# Patient Record
Sex: FEMALE | Race: WHITE | NOT HISPANIC OR LATINO | Employment: FULL TIME | URBAN - METROPOLITAN AREA
[De-identification: names, ages, dates, MRNs, and addresses within clinical notes are randomized per-mention and may not be internally consistent; named-entity substitution may affect disease eponyms.]

---

## 2018-12-17 LAB
HUMAN PAPILLOMAVIRUS (HPV): POSITIVE
PAP RECOMMENDATION EXT: NORMAL
PAP SMEAR: NORMAL

## 2019-09-09 ENCOUNTER — HISTORICAL (OUTPATIENT)
Dept: PREADMISSION TESTING | Facility: HOSPITAL | Age: 54
End: 2019-09-09

## 2019-09-09 LAB
ABS NEUT (OLG): 3.89 X10(3)/MCL (ref 2.1–9.2)
ALBUMIN SERPL-MCNC: 4.2 GM/DL (ref 3.4–5)
ALBUMIN/GLOB SERPL: 1.2 RATIO (ref 1.1–2)
ALP SERPL-CCNC: 122 UNIT/L (ref 38–126)
ALT SERPL-CCNC: 24 UNIT/L (ref 12–78)
AST SERPL-CCNC: 20 UNIT/L (ref 15–37)
BASOPHILS # BLD AUTO: 0.1 X10(3)/MCL (ref 0–0.2)
BASOPHILS NFR BLD AUTO: 1 %
BILIRUB SERPL-MCNC: 0.4 MG/DL (ref 0.2–1)
BILIRUBIN DIRECT+TOT PNL SERPL-MCNC: 0.1 MG/DL (ref 0–0.5)
BILIRUBIN DIRECT+TOT PNL SERPL-MCNC: 0.3 MG/DL (ref 0–0.8)
BUN SERPL-MCNC: 22 MG/DL (ref 7–18)
CALCIUM SERPL-MCNC: 9.5 MG/DL (ref 8.5–10.1)
CHLORIDE SERPL-SCNC: 103 MMOL/L (ref 98–107)
CO2 SERPL-SCNC: 28 MMOL/L (ref 21–32)
CREAT SERPL-MCNC: 0.88 MG/DL (ref 0.55–1.02)
EOSINOPHIL # BLD AUTO: 0.1 X10(3)/MCL (ref 0–0.9)
EOSINOPHIL NFR BLD AUTO: 2 %
ERYTHROCYTE [DISTWIDTH] IN BLOOD BY AUTOMATED COUNT: 13.2 % (ref 11.5–17)
GLOBULIN SER-MCNC: 3.6 GM/DL (ref 2.4–3.5)
GLUCOSE SERPL-MCNC: 78 MG/DL (ref 74–106)
HCT VFR BLD AUTO: 45.7 % (ref 37–47)
HGB BLD-MCNC: 14.4 GM/DL (ref 12–16)
LYMPHOCYTES # BLD AUTO: 2.1 X10(3)/MCL (ref 0.6–4.6)
LYMPHOCYTES NFR BLD AUTO: 32 %
MCH RBC QN AUTO: 30 PG (ref 27–31)
MCHC RBC AUTO-ENTMCNC: 31.5 GM/DL (ref 33–36)
MCV RBC AUTO: 95.2 FL (ref 80–94)
MONOCYTES # BLD AUTO: 0.5 X10(3)/MCL (ref 0.1–1.3)
MONOCYTES NFR BLD AUTO: 7 %
NEUTROPHILS # BLD AUTO: 3.89 X10(3)/MCL (ref 2.1–9.2)
NEUTROPHILS NFR BLD AUTO: 59 %
PLATELET # BLD AUTO: 347 X10(3)/MCL (ref 130–400)
PMV BLD AUTO: 11.3 FL (ref 9.4–12.4)
POTASSIUM SERPL-SCNC: 4 MMOL/L (ref 3.5–5.1)
PROT SERPL-MCNC: 7.8 GM/DL (ref 6.4–8.2)
RBC # BLD AUTO: 4.8 X10(6)/MCL (ref 4.2–5.4)
SODIUM SERPL-SCNC: 139 MMOL/L (ref 136–145)
WBC # SPEC AUTO: 6.6 X10(3)/MCL (ref 4.5–11.5)

## 2019-09-12 ENCOUNTER — HISTORICAL (OUTPATIENT)
Dept: SURGERY | Facility: HOSPITAL | Age: 54
End: 2019-09-12

## 2020-05-25 ENCOUNTER — HISTORICAL (OUTPATIENT)
Dept: ADMINISTRATIVE | Facility: HOSPITAL | Age: 55
End: 2020-05-25

## 2020-05-25 LAB
ABS NEUT (OLG): 2.3 X10(3)/MCL (ref 2.1–9.2)
ALBUMIN SERPL-MCNC: 4.2 GM/DL (ref 3.4–5)
ALBUMIN/GLOB SERPL: 1.56 {RATIO} (ref 1.5–2.5)
ALP SERPL-CCNC: 103 UNIT/L (ref 38–126)
ALT SERPL-CCNC: 22 UNIT/L (ref 7–52)
APPEARANCE, UA: ABNORMAL
AST SERPL-CCNC: 40 UNIT/L (ref 15–37)
BACTERIA #/AREA URNS AUTO: ABNORMAL /HPF
BILIRUB SERPL-MCNC: 0.6 MG/DL (ref 0.2–1)
BILIRUB UR QL STRIP: ABNORMAL MG/DL
BILIRUBIN DIRECT+TOT PNL SERPL-MCNC: 0.1 MG/DL (ref 0–0.5)
BILIRUBIN DIRECT+TOT PNL SERPL-MCNC: 0.5 MG/DL
BUN SERPL-MCNC: 17 MG/DL (ref 7–18)
CALCIUM SERPL-MCNC: 9.6 MG/DL (ref 8.5–10)
CHLORIDE SERPL-SCNC: 106 MMOL/L (ref 98–107)
CHOLEST SERPL-MCNC: 208 MG/DL (ref 0–200)
CHOLEST/HDLC SERPL: 2.6 {RATIO}
CO2 SERPL-SCNC: 29 MMOL/L (ref 21–32)
COLOR UR: YELLOW
CREAT SERPL-MCNC: 0.74 MG/DL (ref 0.6–1.3)
ERYTHROCYTE [DISTWIDTH] IN BLOOD BY AUTOMATED COUNT: 12.9 % (ref 11.5–17)
GLOBULIN SER-MCNC: 2.7 GM/DL (ref 1.2–3)
GLUCOSE (UA): NEGATIVE MG/DL
GLUCOSE SERPL-MCNC: 90 MG/DL (ref 74–106)
HCT VFR BLD AUTO: 42.5 % (ref 37–47)
HDLC SERPL-MCNC: 79 MG/DL (ref 35–60)
HGB BLD-MCNC: 13.9 GM/DL (ref 12–16)
HGB UR QL STRIP: NEGATIVE UNIT/L
KETONES UR QL STRIP: ABNORMAL MG/DL
LDLC SERPL CALC-MCNC: 106 MG/DL (ref 0–129)
LEUKOCYTE ESTERASE UR QL STRIP: ABNORMAL UNIT/L
LYMPHOCYTES # BLD AUTO: 2 X10(3)/MCL (ref 0.6–3.4)
LYMPHOCYTES NFR BLD AUTO: 41.1 % (ref 13–40)
MCH RBC QN AUTO: 30.3 PG (ref 27–31.2)
MCHC RBC AUTO-ENTMCNC: 33 GM/DL (ref 32–36)
MCV RBC AUTO: 93 FL (ref 80–94)
MONOCYTES # BLD AUTO: 0.5 X10(3)/MCL (ref 0.1–1.3)
MONOCYTES NFR BLD AUTO: 11.4 % (ref 0.1–24)
NEUTROPHILS NFR BLD AUTO: 47.5 % (ref 47–80)
NITRITE UR QL STRIP.AUTO: NEGATIVE
PH UR STRIP: 6 [PH]
PLATELET # BLD AUTO: 329 X10(3)/MCL (ref 130–400)
PMV BLD AUTO: 9.6 FL (ref 9.4–12.4)
POTASSIUM SERPL-SCNC: 5.1 MMOL/L (ref 3.5–5.1)
PROT SERPL-MCNC: 6.9 GM/DL (ref 6.4–8.2)
PROT UR QL STRIP: NEGATIVE MG/DL
RBC # BLD AUTO: 4.59 X10(6)/MCL (ref 4.2–5.4)
RBC #/AREA URNS HPF: ABNORMAL /HPF
SODIUM SERPL-SCNC: 140 MMOL/L (ref 136–145)
SP GR UR STRIP: 1.02
SQUAMOUS EPITHELIAL, UA: ABNORMAL /LPF
TRIGL SERPL-MCNC: 82 MG/DL (ref 30–150)
UROBILINOGEN UR STRIP-ACNC: 0.2 MG/DL
VLDLC SERPL CALC-MCNC: 16.4 MG/DL
WBC # SPEC AUTO: 4.8 X10(3)/MCL (ref 4.5–11.5)
WBC #/AREA URNS AUTO: ABNORMAL /[HPF]

## 2020-09-30 ENCOUNTER — HISTORICAL (OUTPATIENT)
Dept: ADMINISTRATIVE | Facility: HOSPITAL | Age: 55
End: 2020-09-30

## 2020-09-30 LAB
ABS NEUT (OLG): 3.3 X10(3)/MCL (ref 2.1–9.2)
ALBUMIN SERPL-MCNC: 4.6 GM/DL (ref 3.4–5)
ALBUMIN/GLOB SERPL: 1.7 {RATIO} (ref 1.5–2.5)
ALP SERPL-CCNC: 122 UNIT/L (ref 38–126)
ALT SERPL-CCNC: 15 UNIT/L (ref 7–52)
APPEARANCE, UA: CLEAR
AST SERPL-CCNC: 23 UNIT/L (ref 15–37)
BACTERIA #/AREA URNS AUTO: ABNORMAL /HPF
BILIRUB SERPL-MCNC: 0.7 MG/DL (ref 0.2–1)
BILIRUB UR QL STRIP: ABNORMAL MG/DL
BILIRUBIN DIRECT+TOT PNL SERPL-MCNC: 0.2 MG/DL (ref 0–0.5)
BILIRUBIN DIRECT+TOT PNL SERPL-MCNC: 0.5 MG/DL
BUN SERPL-MCNC: 15 MG/DL (ref 7–18)
CALCIUM SERPL-MCNC: 9.8 MG/DL (ref 8.5–10)
CHLORIDE SERPL-SCNC: 106 MMOL/L (ref 98–107)
CO2 SERPL-SCNC: 23 MMOL/L (ref 21–32)
COLOR UR: YELLOW
CREAT SERPL-MCNC: 0.77 MG/DL (ref 0.6–1.3)
ERYTHROCYTE [DISTWIDTH] IN BLOOD BY AUTOMATED COUNT: 13.1 % (ref 11.5–17)
GLOBULIN SER-MCNC: 2.7 GM/DL (ref 1.2–3)
GLUCOSE (UA): NEGATIVE MG/DL
GLUCOSE SERPL-MCNC: 89 MG/DL (ref 74–106)
H PYLORI AB SER IA-ACNC: POSITIVE
HCT VFR BLD AUTO: 44.7 % (ref 37–47)
HGB BLD-MCNC: 14.6 GM/DL (ref 12–16)
HGB UR QL STRIP: NEGATIVE UNIT/L
KETONES UR QL STRIP: ABNORMAL MG/DL
LEUKOCYTE ESTERASE UR QL STRIP: NEGATIVE UNIT/L
LIPASE SERPL-CCNC: 40 U/L
LYMPHOCYTES # BLD AUTO: 2.2 X10(3)/MCL (ref 0.6–3.4)
LYMPHOCYTES NFR BLD AUTO: 36.9 % (ref 13–40)
MCH RBC QN AUTO: 29.5 PG (ref 27–31.2)
MCHC RBC AUTO-ENTMCNC: 33 GM/DL (ref 32–36)
MCV RBC AUTO: 90 FL (ref 80–94)
MONOCYTES # BLD AUTO: 0.5 X10(3)/MCL (ref 0.1–1.3)
MONOCYTES NFR BLD AUTO: 7.7 % (ref 0.1–24)
NEUTROPHILS NFR BLD AUTO: 55.4 % (ref 47–80)
NITRITE UR QL STRIP.AUTO: NEGATIVE
PH UR STRIP: 5 [PH]
PLATELET # BLD AUTO: 330 X10(3)/MCL (ref 130–400)
PMV BLD AUTO: 9.7 FL (ref 9.4–12.4)
POTASSIUM SERPL-SCNC: 4.2 MMOL/L (ref 3.5–5.1)
PROT SERPL-MCNC: 7.3 GM/DL (ref 6.4–8.2)
PROT UR QL STRIP: NEGATIVE MG/DL
RBC # BLD AUTO: 4.95 X10(6)/MCL (ref 4.2–5.4)
RBC #/AREA URNS HPF: ABNORMAL /HPF
SODIUM SERPL-SCNC: 141 MMOL/L (ref 136–145)
SP GR UR STRIP: >1.03
SQUAMOUS EPITHELIAL, UA: ABNORMAL /LPF
UROBILINOGEN UR STRIP-ACNC: 0.2 MG/DL
WBC # SPEC AUTO: 6 X10(3)/MCL (ref 4.5–11.5)
WBC #/AREA URNS AUTO: ABNORMAL /[HPF]

## 2020-11-24 ENCOUNTER — HISTORICAL (OUTPATIENT)
Dept: ADMINISTRATIVE | Facility: HOSPITAL | Age: 55
End: 2020-11-24

## 2020-12-16 ENCOUNTER — HISTORICAL (OUTPATIENT)
Dept: ADMINISTRATIVE | Facility: HOSPITAL | Age: 55
End: 2020-12-16

## 2020-12-29 ENCOUNTER — HISTORICAL (OUTPATIENT)
Dept: CARDIOLOGY | Facility: HOSPITAL | Age: 55
End: 2020-12-29

## 2020-12-29 LAB
INR PPP: 0.9 (ref 0–1.3)
PLATELET # BLD AUTO: 319 X10(3)/MCL (ref 130–400)
PROTHROMBIN TIME: 11.5 SECOND(S) (ref 11.1–13.7)

## 2021-06-02 ENCOUNTER — HISTORICAL (OUTPATIENT)
Dept: ADMINISTRATIVE | Facility: HOSPITAL | Age: 56
End: 2021-06-02

## 2021-06-02 LAB
ABS NEUT (OLG): 2 X10(3)/MCL (ref 2.1–9.2)
ALBUMIN SERPL-MCNC: 4.4 GM/DL (ref 3.4–5)
ALBUMIN/GLOB SERPL: 1.91 {RATIO} (ref 1.5–2.5)
ALP SERPL-CCNC: 109 UNIT/L (ref 38–126)
ALT SERPL-CCNC: 18 UNIT/L (ref 7–52)
ANTINUCLEAR ANTIBODY SCREEN (OHS): NEGATIVE
APPEARANCE, UA: CLEAR
AST SERPL-CCNC: 22 UNIT/L (ref 15–37)
BACTERIA #/AREA URNS AUTO: NORMAL /HPF
BILIRUB SERPL-MCNC: 0.7 MG/DL (ref 0.2–1)
BILIRUB UR QL STRIP: NEGATIVE MG/DL
BILIRUBIN DIRECT+TOT PNL SERPL-MCNC: 0.1 MG/DL (ref 0–0.5)
BILIRUBIN DIRECT+TOT PNL SERPL-MCNC: 0.6 MG/DL
BUN SERPL-MCNC: 17 MG/DL (ref 7–18)
CALCIUM SERPL-MCNC: 9.3 MG/DL (ref 8.5–10)
CHLORIDE SERPL-SCNC: 106 MMOL/L (ref 98–107)
CHOLEST SERPL-MCNC: 206 MG/DL (ref 0–200)
CHOLEST/HDLC SERPL: 2.7 {RATIO}
CO2 SERPL-SCNC: 24 MMOL/L (ref 21–32)
COLOR UR: YELLOW
CREAT SERPL-MCNC: 0.76 MG/DL (ref 0.6–1.3)
DSDNA ANTIBODY (OHS): NEGATIVE
ERYTHROCYTE [DISTWIDTH] IN BLOOD BY AUTOMATED COUNT: 12.7 % (ref 11.5–17)
ERYTHROCYTE [SEDIMENTATION RATE] IN BLOOD: 7 MM/HR (ref 0–20)
GLOBULIN SER-MCNC: 2.3 GM/DL (ref 1.2–3)
GLUCOSE (UA): NEGATIVE MG/DL
GLUCOSE SERPL-MCNC: 88 MG/DL (ref 74–106)
HCT VFR BLD AUTO: 42.4 % (ref 37–47)
HDLC SERPL-MCNC: 76 MG/DL (ref 35–60)
HGB BLD-MCNC: 14.1 GM/DL (ref 12–16)
HGB UR QL STRIP: NEGATIVE UNIT/L
KETONES UR QL STRIP: NEGATIVE MG/DL
LDLC SERPL CALC-MCNC: 110 MG/DL (ref 0–129)
LEUKOCYTE ESTERASE UR QL STRIP: NEGATIVE UNIT/L
LYMPHOCYTES # BLD AUTO: 2 X10(3)/MCL (ref 0.6–3.4)
LYMPHOCYTES NFR BLD AUTO: 45.3 % (ref 13–40)
MCH RBC QN AUTO: 30.8 PG (ref 27–31.2)
MCHC RBC AUTO-ENTMCNC: 33 GM/DL (ref 32–36)
MCV RBC AUTO: 93 FL (ref 80–94)
MONOCYTES # BLD AUTO: 0.4 X10(3)/MCL (ref 0.1–1.3)
MONOCYTES NFR BLD AUTO: 8.1 % (ref 0.1–24)
NEUTROPHILS NFR BLD AUTO: 46.6 % (ref 47–80)
NITRITE UR QL STRIP.AUTO: NEGATIVE
PH UR STRIP: 5.5 [PH]
PLATELET # BLD AUTO: 236 X10(3)/MCL (ref 130–400)
PMV BLD AUTO: 10.2 FL (ref 9.4–12.4)
POTASSIUM SERPL-SCNC: 4.5 MMOL/L (ref 3.5–5.1)
PROT SERPL-MCNC: 6.7 GM/DL (ref 6.4–8.2)
PROT UR QL STRIP: NEGATIVE MG/DL
RBC # BLD AUTO: 4.58 X10(6)/MCL (ref 4.2–5.4)
RBC #/AREA URNS HPF: NORMAL /HPF
RHEUMATOID FACT SERPL-ACNC: <13 IU/ML
SODIUM SERPL-SCNC: 140 MMOL/L (ref 136–145)
SP GR UR STRIP: 1.02
SQUAMOUS EPITHELIAL, UA: NORMAL /LPF
TRIGL SERPL-MCNC: 107 MG/DL (ref 30–150)
TSH SERPL-ACNC: 1.05 MIU/ML (ref 0.35–4.94)
UROBILINOGEN UR STRIP-ACNC: 0.2 MG/DL
VLDLC SERPL CALC-MCNC: 21.4 MG/DL
WBC # SPEC AUTO: 4.4 X10(3)/MCL (ref 4.5–11.5)
WBC #/AREA URNS AUTO: NORMAL /[HPF]

## 2021-06-07 ENCOUNTER — HISTORICAL (OUTPATIENT)
Dept: RADIOLOGY | Facility: HOSPITAL | Age: 56
End: 2021-06-07

## 2021-07-22 ENCOUNTER — HISTORICAL (OUTPATIENT)
Dept: RADIOLOGY | Facility: HOSPITAL | Age: 56
End: 2021-07-22

## 2022-04-12 ENCOUNTER — HISTORICAL (OUTPATIENT)
Dept: ADMINISTRATIVE | Facility: HOSPITAL | Age: 57
End: 2022-04-12

## 2022-04-30 VITALS
DIASTOLIC BLOOD PRESSURE: 60 MMHG | SYSTOLIC BLOOD PRESSURE: 110 MMHG | HEIGHT: 67 IN | WEIGHT: 163.56 LBS | OXYGEN SATURATION: 98 % | BODY MASS INDEX: 25.67 KG/M2

## 2022-04-30 NOTE — OP NOTE
Patient:   Shameka Cespedes             MRN: [id number]            FIN: [id number]               Age:   53 years     Sex:  Female     :  1965   Associated Diagnoses:   None   Author:   Flo Rose MD            DATE OF SURGERY:  19    SURGEON:  Flo Rose MD  ASSISTANT:  None    PREOPERATIVE DIAGNOSIS:  Soft Tissue Mass - Right Flank    POSTOPERATIVE DIAGNOSIS:  Same.    OPERATIONS:  Excision of Soft Tissue Mass    Anesthesia:  General   Estimated blood loss: Minimal (< 20)  Blood administered:  None.   Lap and instrument counts correct x 2 at the end of the case.    SPECIMEN: Soft tissue Mass approximately 10 cm    INDICATIONS/SIGNIFICANT HISTORY:  The patient is a 53 year old female who seen with complaints of a soft tissue mass.   Risks and Benefits of surgical excision was discussed with the patient, who voiced understanding of risks and benefits and elected to proceed with surgery.    PROCEDURE IN DETAIL:  Once informed consents were obtained, the patient was taken to the operating room and placed on the operating table.  After general anesthesia was induced, the marked area was prepped and draped in a standard surgical fashion.  Local anesthesia was obtained with 0.25% marcaine with epinephrine.  An incision was made around the soft tissue mass and it was dissected with blunt and sharp dissection and the mass was passed off the table as a specimen.  The wound was then irrigated and dried and bleeding stopped with cautery.  The skin was then close with suture in a 2 layer fashion with vicryl.  The wounds were cleaned and dried and steri-strips were applied.  The patient tolerated the procedure well and was transported to recovery room in good condition.

## 2022-04-30 NOTE — CONSULTS
Patient:   Shameka Cespedes             MRN: [id number]            FIN: [id number]               Age:   55 years     Sex:  Female     :  1965   Associated Diagnoses:   None   Author:   Carroll Bourgeois MD      Preoperative Information   Procedure/ Case: Epidural Blood Patch   Anesthesiologist scheduled: Carroll Bourgeois MD   see pre-procedure checklist   Beta blocker within last 24 hrs:  see pre-procedure checklist.    Anesthesia history     Anesthesia History ST   Previous Problems With Sedation: None (20 11:34:00)  Family History of Anesthesia Reaction: No (19 14:54:00).     Patient's history: negative.     Family's history: negative.        History of Present Illness   c/o HA, ringing in ears.  Pt. states feels like she is going to pass out after about 2:30 hours upright.  States HR increases ~ 30 bpm and BP decreases when she has symptoms and must lie down. + HA. I inquired about POTS and Mrs. Cespedes is familiar with the syndrome and does not think that is a contributing factor.  She does have postural HA symptoms.  I explained to her and her  my concerns with performing and EBP without a known CSF leak suspected from prior dural puncture or known imaging and they understood.  I explained to them that I was unsure if placing EBP would help, hurt, or have no effect on her symptoms, but was willing to attempt based on symptoms from and possible diagnostic and therapeutic standpoint if she desired.  Pt. and  would like to confirm diagnosis of CSF leak via MRI or myelogram at this point prior to having the blood patch.      Review of Systems   HA's         Health Status   Allergies:    Allergic Reactions (Selected)  No Known Medication Allergies,    Allergies (1) Active Reaction  No Known Medication Allergies None Documented     Current medications:  (Selected)   Inpatient Medications  Pending Complete  midazolam: 2 mg, form: Injection, IV Push, q5min, Order duration: 2  dose(s), first dose 19 7:20:00 CDT, stop date 19 7:29:00 CDT, (up to 5 mg for moderate anxiety)  Prescriptions  Prescribed  Adderall 30 mg oral tablet: 30 mg = 1 tab(s), Oral, BID, # 60 tab(s), 0 Refill(s)  Adderall 30 mg oral tablet: 30 mg = 1 tab(s), Oral, BID, # 60 tab(s), 0 Refill(s)  Zofran ODT 8 mg oral tablet, disintegratin mg, Oral, q8hr, # 15 tab(s), 1 Refill(s), [id number], 167, cm, Height/Length Dosing, 10/05/20 14:45:00 CDT, 65.2, kg, Weight Dosing, 10/05/20 14:45:00 CDT  amphetamine-dextroamphetamine 30 mg oral tablet: 30 mg = 1 tab(s), Oral, BID, # 60 tab(s), 0 Refill(s)  diclofenac potassium 50 mg oral tablet: 50 mg = 1 tab(s), Oral, BID, PRN PRN as needed for pain, # 30 tab(s), 0 Refill(s), Pharmacy: [facility] #[id number], 167, cm, Height/Length Dosing, 10/05/20 14:45:00 CDT, 65.2, kg, Weight Dosing, 10/05/20 14:45:00 CDT  meclizine 25 mg oral tablet: 25 mg = 1 tab(s), Oral, q6hr, # 30 tab(s), 0 Refill(s), Pharmacy: [facility]acy #[id number], 170.17, cm, Height/Length Dosing, 11/10/20 16:30:00 CST, 63.3, kg, Weight Dosing, 11/10/20 16:30:00 CST  omeprazole 40 mg oral DR capsule: 40 mg = 1 cap(s), Oral, Daily, # 30 cap(s), 2 Refill(s), Pharmacy: [facility]/pharmacy #[id number], 167.64, cm, Height/Length Dosing, 20 15:49:00 CDT, 66.3, kg, Weight Dosing, 20 15:49:00 CDT  Documented Medications  Documented  Probiotic Formula (Bacillus Coagulans) oral capsule: 1 cap(s), Oral, Daily, # 30 cap(s), 0 Refill(s),    No qualifying data available     Problem list:    All Problems  ADHD (attention deficit hyperactivity disorder), inattentive type / SNOMED CT 47219712 / Confirmed  CSF leak / SNOMED CT 215297680 / Confirmed  Headache disorder / SNOMED CT 309279829 / Confirmed  Refused influenza vaccine / SNOMED CT 492744037 / Confirmed  Intracranial hypotension, unspecified / SNOMED CT 7225562066 / Confirmed  Lateral epicondylitis, right elbow / SNOMED CT 8860318456 / Confirmed  Anxiety with  depression / SNOMED CT 982741800 / Confirmed  Neck pain / SNOMED CT 675476170 / Confirmed  Restless legs syndrome / SNOMED CT 37168385 / Confirmed  Vertigo / SNOMED CT 6370340659 / Confirmed  Resolved: Diverticulosis of sigmoid colon / SNOMED CT 5779888013  Canceled: ADD - Attention deficit disorder / SNOMED CT 739492584  Canceled: ADD - Attention deficit disorder / SNOMED CT 220457051  Canceled: Anxiety / SNOMED CT 38843716  Canceled: Depression / SNOMED CT 159522791  Canceled: Flank lipoma / SNOMED CT 690999848  Canceled: Wellness examination / SNOMED CT 374901342,    Active Problems (10)  ADHD (attention deficit hyperactivity disorder), inattentive type   Anxiety with depression   CSF leak   Headache disorder   Intracranial hypotension, unspecified   Lateral epicondylitis, right elbow   Neck pain   Refused influenza vaccine   Restless legs syndrome   Vertigo         Histories   Past Medical History:    Resolved  Diverticulosis of sigmoid colon (1212068366):  Resolved.   Family History:    Dementia  Mother ()  Diabetes mellitus  Mother ()  Father ()  Heart disease  Father ()  Mother ()  Brain tumor  Sister  Heart attack  Father ()  Kidney disease  Father ()  Leukemia  Brother  Parkinson disease  Brother  TIA - Transient ischaemic attack  Sister     Procedure history:    Excision Mass/Cyst (Right) on 2019 at 53 Years.  Comments:  2019 9:54 CDT - Lejeune RN, Jennifer Tello  auto-populated from documented surgical case  Colonoscopy ([id number]) on 3/3/2015 at 49 Years.  Endometrial ablation ([id number]) in the month of 10/1996 at 30 Years.  breast augmentation.   section.  BTL.   Social History        Social & Psychosocial Habits    Alcohol  2018  Use: Current    Frequency: 1-2 times per month    Employment/School  2018  Status: Retired    Exercise  2019  Times per week: 3-4 times/week    Home/Environment  2018  Living  situation: Home/Independent    Nutrition/Health  05/31/2018  Type of diet: Regular    Caffeine intake amount: NONE    Substance Use  02/22/2019  Use: Current    Tobacco  12/16/2020  Use: Never (less than 100 in l    Patient Wants Consult For Cessation Counseling N/A    Abuse/Neglect  12/16/2020  SHX Any signs of abuse or neglect No    Feels unsafe at home: No    Safe place to go: Yes  .        Physical Examination   Vital Signs   12/16/2020 11:34 CST     Respiratory Rate          18 br/min                             Oxygen Therapy            Room air    12/16/2020 11:32 CST     Temperature Oral          36.7 DegC                             Temperature Oral (calculated)             98.06 DegF                             Peripheral Pulse Rate     85 bpm                             Respiratory Rate          16 br/min                             SpO2                      76 %  LOW                             Systolic Blood Pressure   124 mmHg                             Diastolic Blood Pressure  81 mmHg                             Mean Arterial Pressure, Cuff              95 mmHg    12/15/2020 13:15 CST     Oxygen Therapy            Room air                             Systolic Blood Pressure   110 mmHg                             Diastolic Blood Pressure  72 mmHg                             Blood Pressure Location   Left arm                             Manual Cuff BP            Yes        Vital Signs (last 24 hrs)_____  Last Charted___________  Temp Oral     36.7 DegC  (DEC 16 11:32)  Heart Rate Peripheral   85 bpm  (DEC 16 11:32)  Resp Rate         18 br/min  (DEC 16 11:34)  SBP      124 mmHg  (DEC 16 11:32)  DBP      81 mmHg  (DEC 16 11:32)  SpO2      L 76%  (DEC 16 11:32)  Weight      62.6 kg  (DEC 16 11:36)  Height      170 cm  (DEC 16 11:36)  BMI      21.66  (DEC 16 11:36)     Measurements from flowsheet : Measurements   12/16/2020 11:36 CST     Weight Dosing             62.6 kg                              Weight Measured           62.6 kg                             Weight Measured and Calculated in Lbs     138.01 lb                             Height/Length Dosing      170 cm                             Height/Length Measured    170 cm                             Body Mass Index Measured  21.66 kg/m2    12/15/2020 13:15 CST     Weight Dosing             65.100 kg                             Weight Measured           65.1 kg                             Weight Measured and Calculated in Lbs     143.52 lb                             Weight Estimated          65.1 kg                             Height/Length Dosing      170.00 cm                             Height/Length Measured    170 cm                             Height/Length Estimated   170 cm                             BSA Measured              1.75 m2                             BSA Estimated             1.75 m2                             Body Mass Index Estimated 22.53 kg/m2                             Body Mass Index Measured  22.53 kg/m2     Pain assessment:  Pain Assessment   12/16/2020 11:00 CST     Pain Present              Yes actual or suspected pain                             Preferred Pain Tool       Numeric rating scale                             Numeric Rating at Rest    2                             Primary Pain Location     Shoulder                             Primary Pain Laterality   Bilateral                             Primary Pain Time Pattern Intermittent                             Primary Pain Quality      Pressure     .    General:  Alert and oriented, No acute distress.    Airway:  Normal temporomandibular joint mobility.         Mallampati classification: II (soft palate, fauces, uvula visible).    Respiratory:  Lungs are clear to auscultation.    Cardiovascular:  Normal rate, Regular rhythm.       Review / Management       Results review:     No qualifying data available.    Documentation reviewed:          Nurses notes: Functional  Status ST   No qualifying data..         Nurses notes: STOP-BANG questionnaire ST   No qualifying data.       Assessment and Plan   Anesthetic Preoperative Plan     Notes: Will hold off on blood patch at this point pending results of MRI and/or myelogram..

## 2022-07-25 PROBLEM — F41.8 ANXIETY WITH DEPRESSION: Status: ACTIVE | Noted: 2022-07-25

## 2022-07-25 PROBLEM — F90.0 ADHD (ATTENTION DEFICIT HYPERACTIVITY DISORDER), INATTENTIVE TYPE: Status: ACTIVE | Noted: 2022-07-25

## 2022-07-27 PROBLEM — Z00.00 WELLNESS EXAMINATION: Status: ACTIVE | Noted: 2022-07-27

## 2022-07-27 PROBLEM — G96.00 CSF LEAK: Status: ACTIVE | Noted: 2022-07-27

## 2022-07-27 PROBLEM — G25.81 RESTLESS LEGS SYNDROME: Status: ACTIVE | Noted: 2022-07-27

## 2022-07-27 PROBLEM — K21.9 GERD (GASTROESOPHAGEAL REFLUX DISEASE): Status: ACTIVE | Noted: 2022-07-27

## 2022-10-31 PROBLEM — Z00.00 WELLNESS EXAMINATION: Status: RESOLVED | Noted: 2022-07-27 | Resolved: 2022-10-31

## 2022-12-16 PROBLEM — Z28.21 IMMUNIZATION REFUSED: Status: ACTIVE | Noted: 2022-12-16

## 2022-12-16 PROBLEM — E03.9 HYPOTHYROIDISM: Status: ACTIVE | Noted: 2022-12-16

## 2022-12-16 PROBLEM — Z00.00 ENCOUNTER FOR WELLNESS EXAMINATION IN ADULT: Status: ACTIVE | Noted: 2022-12-16

## 2023-03-20 PROBLEM — Z00.00 ENCOUNTER FOR WELLNESS EXAMINATION IN ADULT: Status: RESOLVED | Noted: 2022-12-16 | Resolved: 2023-03-20

## 2023-08-05 PROBLEM — K21.9 GASTROESOPHAGEAL REFLUX DISEASE WITHOUT ESOPHAGITIS: Status: ACTIVE | Noted: 2023-08-05

## 2023-08-05 PROBLEM — E03.9 HYPOTHYROIDISM: Status: ACTIVE | Noted: 2023-08-05

## 2023-08-09 PROBLEM — Z82.49 FAMILY HISTORY OF HEART DISEASE: Status: ACTIVE | Noted: 2023-08-09

## 2023-10-31 PROBLEM — E78.00 HYPERCHOLESTEREMIA: Status: ACTIVE | Noted: 2023-10-31

## 2023-11-13 PROBLEM — Z00.00 ENCOUNTER FOR WELLNESS EXAMINATION IN ADULT: Status: RESOLVED | Noted: 2022-12-16 | Resolved: 2023-11-13

## 2024-02-14 NOTE — PROGRESS NOTES
.Medication Refill (No complaints at present )         HPI:    Patient presents for 3 month recheck/refill medications.  Patient states medications are working well; she is able to concentrate, focus and stay on task.  She denies palpitations, unintentional weight loss, headaches or dry mouth.     reviewed.    Narcotics agreement 10/31/2023.    Current Outpatient Medications:     ascorbic acid, vitamin C, (VITAMIN C) 1000 MG tablet, Take 1,000 mg by mouth., Disp: , Rfl:     metronidazole 0.75% (METROCREAM) 0.75 % Crea, Apply 0.75 g topically 2 (two) times daily., Disp: , Rfl:     multivitamin (THERAGRAN) per tablet, Take 1 tablet by mouth once daily., Disp: , Rfl:     omeprazole (PRILOSEC) 40 MG capsule, Take 40 mg by mouth., Disp: , Rfl:     pramipexole (MIRAPEX) 1 MG tablet, Take 1 tablet by mouth 2-3 hours before bedtime., Disp: 30 tablet, Rfl: 5    dextroamphetamine sulfate (DEXTROSTAT) 10 MG tablet, Take 1 tablet (10 mg total) by mouth 2 (two) times daily. Fill: 3/15/2024., Disp: 60 tablet, Rfl: 0    dextroamphetamine sulfate (DEXTROSTAT) 10 MG tablet, Take 1 tablet (10 mg total) by mouth 2 (two) times daily. Fill: 4/15/2024., Disp: 60 tablet, Rfl: 0    dextroamphetamine sulfate (DEXTROSTAT) 10 MG tablet, Take 1 tablet (10 mg total) by mouth 2 (two) times daily., Disp: 60 tablet, Rfl: 0      ROS:    She has been feeling well.    Her appetite varies; she is hungrier at night.  Her sleep varies with her restless legs.   Her anxiety and depression has been up and down.    She has been having some stomach issues; she has an appointment with Dr Treviño on 3/14/2024. She had bad cramps a week ago; she passed a clot in her stool. She had bright red blood with the next few bowel movements.      PE:    ..  Vitals:    02/15/24 1432   BP: 122/78   Pulse: 88   Resp: 18   Temp: 97.7 °F (36.5 °C)        General:  She is well-developed well-nourished white female in no apparent distress.  She is alert and oriented.     Chest: Clear to auscultation bilaterally.    CV: Regular rate and rhythm without murmurs rubs or gallops.        1. ADHD (attention deficit hyperactivity disorder), inattentive type  Overview:  Patient is doing well on medications; refills x3 given.    10/31/2023: Patient is doing well on medications; refills x3 sent to pharmacy.  Narcotics agreement updated today.    02/15/2024: Patient is doing well on medications; refills x3 sent to pharmacy.      Other orders  -     Discontinue: dextroamphetamine sulfate (DEXTROSTAT) 10 MG tablet; Take 1 tablet (10 mg total) by mouth 2 (two) times daily.  Dispense: 60 tablet; Refill: 0  -     Discontinue: dextroamphetamine sulfate (DEXTROSTAT) 10 MG tablet; Take 1 tablet (10 mg total) by mouth 2 (two) times daily. Fill: 3/15/2024.  Dispense: 60 tablet; Refill: 0  -     Discontinue: dextroamphetamine sulfate (DEXTROSTAT) 10 MG tablet; Take 1 tablet (10 mg total) by mouth 2 (two) times daily. Fill: 4/15/2024.  Dispense: 60 tablet; Refill: 0  -     dextroamphetamine sulfate (DEXTROSTAT) 10 MG tablet; Take 1 tablet (10 mg total) by mouth 2 (two) times daily. Fill: 3/15/2024.  Dispense: 60 tablet; Refill: 0  -     dextroamphetamine sulfate (DEXTROSTAT) 10 MG tablet; Take 1 tablet (10 mg total) by mouth 2 (two) times daily. Fill: 4/15/2024.  Dispense: 60 tablet; Refill: 0  -     dextroamphetamine sulfate (DEXTROSTAT) 10 MG tablet; Take 1 tablet (10 mg total) by mouth 2 (two) times daily.  Dispense: 60 tablet; Refill: 0              ..Follow up in about 3 months (around 5/15/2024) for Refill meds.

## 2024-02-15 ENCOUNTER — OFFICE VISIT (OUTPATIENT)
Dept: PRIMARY CARE CLINIC | Facility: CLINIC | Age: 59
End: 2024-02-15
Payer: COMMERCIAL

## 2024-02-15 VITALS
RESPIRATION RATE: 18 BRPM | WEIGHT: 176.31 LBS | HEIGHT: 66 IN | SYSTOLIC BLOOD PRESSURE: 122 MMHG | TEMPERATURE: 98 F | DIASTOLIC BLOOD PRESSURE: 78 MMHG | OXYGEN SATURATION: 99 % | HEART RATE: 88 BPM | BODY MASS INDEX: 28.33 KG/M2

## 2024-02-15 DIAGNOSIS — F90.0 ADHD (ATTENTION DEFICIT HYPERACTIVITY DISORDER), INATTENTIVE TYPE: Primary | ICD-10-CM

## 2024-02-15 PROCEDURE — 1159F MED LIST DOCD IN RCRD: CPT | Mod: CPTII,,, | Performed by: FAMILY MEDICINE

## 2024-02-15 PROCEDURE — 1160F RVW MEDS BY RX/DR IN RCRD: CPT | Mod: CPTII,,, | Performed by: FAMILY MEDICINE

## 2024-02-15 PROCEDURE — 3074F SYST BP LT 130 MM HG: CPT | Mod: CPTII,,, | Performed by: FAMILY MEDICINE

## 2024-02-15 PROCEDURE — 3008F BODY MASS INDEX DOCD: CPT | Mod: CPTII,,, | Performed by: FAMILY MEDICINE

## 2024-02-15 PROCEDURE — 99213 OFFICE O/P EST LOW 20 MIN: CPT | Mod: ,,, | Performed by: FAMILY MEDICINE

## 2024-02-15 PROCEDURE — 3078F DIAST BP <80 MM HG: CPT | Mod: CPTII,,, | Performed by: FAMILY MEDICINE

## 2024-02-15 RX ORDER — DEXTROAMPHETAMINE SULFATE 10 MG/1
10 TABLET ORAL 2 TIMES DAILY
Qty: 60 TABLET | Refills: 0 | Status: SHIPPED | OUTPATIENT
Start: 2024-02-15 | End: 2024-03-16

## 2024-02-15 RX ORDER — DEXTROAMPHETAMINE SULFATE 10 MG/1
10 TABLET ORAL 2 TIMES DAILY
Qty: 60 TABLET | Refills: 0 | Status: SHIPPED | OUTPATIENT
Start: 2024-02-15 | End: 2024-02-15 | Stop reason: SDUPTHER

## 2024-06-28 ENCOUNTER — PATIENT OUTREACH (OUTPATIENT)
Facility: CLINIC | Age: 59
End: 2024-06-28
Payer: COMMERCIAL

## 2024-06-28 DIAGNOSIS — Z12.31 ENCOUNTER FOR SCREENING MAMMOGRAM FOR MALIGNANT NEOPLASM OF BREAST: Primary | ICD-10-CM

## 2024-06-28 NOTE — PROGRESS NOTES
Health Maintenance Topic(s) Outreach Outcomes & Actions Taken:  Spoke with patient extensively, very pleasant. I will order her Mammogram and refer her to [facility]    Lab(s) - Outreach Outcomes & Actions Taken  : Dr. Emmanuel to order labs.     Additional Notes:           Care Management, Digital Medicine, and/or Education Referrals

## 2024-06-29 NOTE — PROGRESS NOTES
"Medication Refill (3 month med refill)       HPI:    Patient presents for 3 month recheck/refill medications.  Patient states medications are working well; she is able to concentrate, focus and stay on task.  She denies palpitations, unintentional weight loss, headaches or dry mouth.     reviewed.    Narcotics agreement 10/31/2023.      Current Outpatient Medications   Medication Instructions    ascorbic acid (vitamin C) (VITAMIN C) 1,000 mg, Oral    aspirin (ECOTRIN) 81 MG EC tablet 0    dextroamphetamine sulfate (DEXTROSTAT) 10 mg, Oral, 2 times daily    dextroamphetamine sulfate (DEXTROSTAT) 10 mg, Oral, 2 times daily, Fill: 8/02/2024.    dextroamphetamine sulfate (DEXTROSTAT) 10 mg, Oral, 2 times daily, Fill: 9/02/2024.    mecobalamin, vitamin B12, 1,000 mcg Chew tablet    metronidazole 0.75% (METROCREAM) 0.75 g, Topical (Top), 2 times daily    multivitamin (THERAGRAN) per tablet 1 tablet, Oral, Daily    pramipexole (MIRAPEX) 1 MG tablet Take 1 tablet by mouth 2-3 hours before bedtime.    sucralfate (CARAFATE) 1 gram tablet 60 tablets         ROS:    She has been feeling well.  Her sleep varies; she has problems falling asleep at times. She still has restless legs at times.  She has lost 20 # in August. She follow a healthy diet. She eats 1200 kcal a day.  She walks 3-4 times a week. She does yoga a few times a week.      PE:    ..Visit Vitals  /76   Pulse 72   Temp 98 °F (36.7 °C)   Ht 5' 6" (1.676 m)   Wt 71.1 kg (156 lb 12.8 oz)   LMP  (LMP Unknown)   SpO2 96%   BMI 25.31 kg/m²        General:  She is well-developed well-nourished white female in no apparent distress.  She is alert and oriented.    Chest: Clear to auscultation bilaterally.    CV: Regular rate and rhythm without murmurs rubs or gallops.        1. ADHD (attention deficit hyperactivity disorder), inattentive type  Overview:  Patient is doing well on medications; refills x3 given.    10/31/2023: Patient is doing well on medications; " refills x3 sent to pharmacy.  Narcotics agreement updated today.    02/15/2024: Patient is doing well on medications; refills x3 sent to pharmacy.      Other orders  -     dextroamphetamine sulfate (DEXTROSTAT) 10 MG tablet; Take 1 tablet (10 mg total) by mouth 2 (two) times daily.  Dispense: 60 tablet; Refill: 0  -     dextroamphetamine sulfate (DEXTROSTAT) 10 MG tablet; Take 1 tablet (10 mg total) by mouth 2 (two) times daily. Fill: 8/02/2024.  Dispense: 60 tablet; Refill: 0  -     dextroamphetamine sulfate (DEXTROSTAT) 10 MG tablet; Take 1 tablet (10 mg total) by mouth 2 (two) times daily. Fill: 9/02/2024.  Dispense: 60 tablet; Refill: 0      Patient congratulated on weight loss.        ..Follow up in about 1 month (around 8/2/2024) for Wellness.       No future appointments.

## 2024-07-02 ENCOUNTER — OFFICE VISIT (OUTPATIENT)
Dept: PRIMARY CARE CLINIC | Facility: CLINIC | Age: 59
End: 2024-07-02
Payer: COMMERCIAL

## 2024-07-02 VITALS
BODY MASS INDEX: 25.2 KG/M2 | HEIGHT: 66 IN | DIASTOLIC BLOOD PRESSURE: 76 MMHG | OXYGEN SATURATION: 96 % | TEMPERATURE: 98 F | HEART RATE: 72 BPM | SYSTOLIC BLOOD PRESSURE: 117 MMHG | WEIGHT: 156.81 LBS

## 2024-07-02 DIAGNOSIS — K21.9 GASTROESOPHAGEAL REFLUX DISEASE WITHOUT ESOPHAGITIS: ICD-10-CM

## 2024-07-02 DIAGNOSIS — F90.0 ADHD (ATTENTION DEFICIT HYPERACTIVITY DISORDER), INATTENTIVE TYPE: Primary | ICD-10-CM

## 2024-07-02 PROCEDURE — 1159F MED LIST DOCD IN RCRD: CPT | Mod: CPTII,,, | Performed by: FAMILY MEDICINE

## 2024-07-02 PROCEDURE — 1160F RVW MEDS BY RX/DR IN RCRD: CPT | Mod: CPTII,,, | Performed by: FAMILY MEDICINE

## 2024-07-02 PROCEDURE — 3078F DIAST BP <80 MM HG: CPT | Mod: CPTII,,, | Performed by: FAMILY MEDICINE

## 2024-07-02 PROCEDURE — 3074F SYST BP LT 130 MM HG: CPT | Mod: CPTII,,, | Performed by: FAMILY MEDICINE

## 2024-07-02 PROCEDURE — 3008F BODY MASS INDEX DOCD: CPT | Mod: CPTII,,, | Performed by: FAMILY MEDICINE

## 2024-07-02 PROCEDURE — 99213 OFFICE O/P EST LOW 20 MIN: CPT | Mod: ,,, | Performed by: FAMILY MEDICINE

## 2024-07-02 RX ORDER — DEXTROAMPHETAMINE SULFATE 10 MG/1
10 TABLET ORAL 2 TIMES DAILY
Qty: 60 TABLET | Refills: 0 | Status: SHIPPED | OUTPATIENT
Start: 2024-07-02 | End: 2024-08-01

## 2024-07-02 RX ORDER — SUCRALFATE 1 G/1
60 TABLET ORAL
COMMUNITY
Start: 2024-04-29

## 2024-07-02 RX ORDER — MECOBALAMIN 1000 MCG
TABLET,CHEWABLE ORAL
COMMUNITY

## 2024-07-02 RX ORDER — ASPIRIN 81 MG/1
TABLET ORAL
COMMUNITY

## 2024-07-11 ENCOUNTER — HOSPITAL ENCOUNTER (OUTPATIENT)
Dept: RADIOLOGY | Facility: HOSPITAL | Age: 59
Discharge: HOME | End: 2024-07-11
Attending: FAMILY MEDICINE
Payer: COMMERCIAL

## 2024-07-11 DIAGNOSIS — Z12.31 ENCOUNTER FOR SCREENING MAMMOGRAM FOR MALIGNANT NEOPLASM OF BREAST: ICD-10-CM

## 2024-07-11 PROBLEM — E78.2 MODERATE MIXED HYPERLIPIDEMIA NOT REQUIRING STATIN THERAPY: Status: ACTIVE | Noted: 2023-10-31

## 2024-07-11 PROCEDURE — 77067 SCR MAMMO BI INCL CAD: CPT | Mod: 26,,, | Performed by: STUDENT IN AN ORGANIZED HEALTH CARE EDUCATION/TRAINING PROGRAM

## 2024-07-11 PROCEDURE — 77063 BREAST TOMOSYNTHESIS BI: CPT | Mod: 26,,, | Performed by: STUDENT IN AN ORGANIZED HEALTH CARE EDUCATION/TRAINING PROGRAM

## 2024-07-11 PROCEDURE — 77063 BREAST TOMOSYNTHESIS BI: CPT | Mod: TC

## 2024-07-11 PROCEDURE — 77067 SCR MAMMO BI INCL CAD: CPT | Mod: TC

## 2024-08-08 DIAGNOSIS — Z00.00 WELLNESS EXAMINATION: Primary | ICD-10-CM

## 2024-08-13 DIAGNOSIS — E03.9 HYPOTHYROIDISM, UNSPECIFIED TYPE: Primary | ICD-10-CM

## 2024-08-28 DIAGNOSIS — Z00.00 WELLNESS EXAMINATION: Primary | ICD-10-CM

## 2024-09-09 ENCOUNTER — LAB VISIT (OUTPATIENT)
Dept: LAB | Facility: HOSPITAL | Age: 59
End: 2024-09-09
Attending: FAMILY MEDICINE
Payer: COMMERCIAL

## 2024-09-09 DIAGNOSIS — E03.9 HYPOTHYROIDISM, UNSPECIFIED TYPE: ICD-10-CM

## 2024-09-09 DIAGNOSIS — Z00.00 WELLNESS EXAMINATION: ICD-10-CM

## 2024-09-09 LAB
ALBUMIN SERPL-MCNC: 3.7 G/DL (ref 3.5–5)
ALBUMIN/GLOB SERPL: 1.4 RATIO (ref 1.1–2)
ALP SERPL-CCNC: 142 UNIT/L (ref 40–150)
ALT SERPL-CCNC: 15 UNIT/L (ref 0–55)
ANION GAP SERPL CALC-SCNC: 7 MEQ/L
AST SERPL-CCNC: 18 UNIT/L (ref 5–34)
BACTERIA #/AREA URNS AUTO: ABNORMAL /HPF
BASOPHILS # BLD AUTO: 0.06 X10(3)/MCL
BASOPHILS NFR BLD AUTO: 1.2 %
BILIRUB SERPL-MCNC: 0.5 MG/DL
BILIRUB UR QL STRIP.AUTO: NEGATIVE
BUN SERPL-MCNC: 13.4 MG/DL (ref 9.8–20.1)
CALCIUM SERPL-MCNC: 9.7 MG/DL (ref 8.4–10.2)
CHLORIDE SERPL-SCNC: 110 MMOL/L (ref 98–107)
CHOLEST SERPL-MCNC: 186 MG/DL
CHOLEST/HDLC SERPL: 3 {RATIO} (ref 0–5)
CLARITY UR: CLEAR
CO2 SERPL-SCNC: 23 MMOL/L (ref 22–29)
COLOR UR AUTO: ABNORMAL
CREAT SERPL-MCNC: 0.79 MG/DL (ref 0.55–1.02)
CREAT/UREA NIT SERPL: 17
EOSINOPHIL # BLD AUTO: 0.1 X10(3)/MCL (ref 0–0.9)
EOSINOPHIL NFR BLD AUTO: 2 %
ERYTHROCYTE [DISTWIDTH] IN BLOOD BY AUTOMATED COUNT: 13.2 % (ref 11.5–17)
EST. AVERAGE GLUCOSE BLD GHB EST-MCNC: 108.3 MG/DL
GFR SERPLBLD CREATININE-BSD FMLA CKD-EPI: >60 ML/MIN/1.73/M2
GLOBULIN SER-MCNC: 2.7 GM/DL (ref 2.4–3.5)
GLUCOSE SERPL-MCNC: 88 MG/DL (ref 74–100)
GLUCOSE UR QL STRIP: NORMAL
HBA1C MFR BLD: 5.4 %
HCT VFR BLD AUTO: 40 % (ref 37–47)
HDLC SERPL-MCNC: 74 MG/DL (ref 35–60)
HGB BLD-MCNC: 12.8 G/DL (ref 12–16)
HGB UR QL STRIP: NEGATIVE
IMM GRANULOCYTES # BLD AUTO: 0.01 X10(3)/MCL (ref 0–0.04)
IMM GRANULOCYTES NFR BLD AUTO: 0.2 %
KETONES UR QL STRIP: NEGATIVE
LDLC SERPL CALC-MCNC: 99 MG/DL (ref 50–140)
LEUKOCYTE ESTERASE UR QL STRIP: NEGATIVE
LYMPHOCYTES # BLD AUTO: 2.2 X10(3)/MCL (ref 0.6–4.6)
LYMPHOCYTES NFR BLD AUTO: 44.4 %
MCH RBC QN AUTO: 29.6 PG (ref 27–31)
MCHC RBC AUTO-ENTMCNC: 32 G/DL (ref 33–36)
MCV RBC AUTO: 92.6 FL (ref 80–94)
MONOCYTES # BLD AUTO: 0.34 X10(3)/MCL (ref 0.1–1.3)
MONOCYTES NFR BLD AUTO: 6.9 %
MUCOUS THREADS URNS QL MICRO: ABNORMAL /LPF
NEUTROPHILS # BLD AUTO: 2.24 X10(3)/MCL (ref 2.1–9.2)
NEUTROPHILS NFR BLD AUTO: 45.3 %
NITRITE UR QL STRIP: NEGATIVE
NRBC BLD AUTO-RTO: 0 %
PH UR STRIP: 6 [PH]
PLATELET # BLD AUTO: 330 X10(3)/MCL (ref 130–400)
PMV BLD AUTO: 10.1 FL (ref 7.4–10.4)
POTASSIUM SERPL-SCNC: 4.3 MMOL/L (ref 3.5–5.1)
PROT SERPL-MCNC: 6.4 GM/DL (ref 6.4–8.3)
PROT UR QL STRIP: NEGATIVE
RBC # BLD AUTO: 4.32 X10(6)/MCL (ref 4.2–5.4)
RBC #/AREA URNS AUTO: ABNORMAL /HPF
SODIUM SERPL-SCNC: 140 MMOL/L (ref 136–145)
SP GR UR STRIP.AUTO: 1.02 (ref 1–1.03)
SQUAMOUS #/AREA URNS LPF: ABNORMAL /HPF
T4 FREE SERPL-MCNC: 0.96 NG/DL (ref 0.7–1.48)
TRIGL SERPL-MCNC: 66 MG/DL (ref 37–140)
TSH SERPL-ACNC: 2.43 UIU/ML (ref 0.35–4.94)
UROBILINOGEN UR STRIP-ACNC: NORMAL
VLDLC SERPL CALC-MCNC: 13 MG/DL
WBC # BLD AUTO: 4.95 X10(3)/MCL (ref 4.5–11.5)
WBC #/AREA URNS AUTO: ABNORMAL /HPF

## 2024-09-09 PROCEDURE — 80061 LIPID PANEL: CPT

## 2024-09-09 PROCEDURE — 83036 HEMOGLOBIN GLYCOSYLATED A1C: CPT

## 2024-09-09 PROCEDURE — 84443 ASSAY THYROID STIM HORMONE: CPT

## 2024-09-09 PROCEDURE — 85025 COMPLETE CBC W/AUTO DIFF WBC: CPT

## 2024-09-09 PROCEDURE — 81001 URINALYSIS AUTO W/SCOPE: CPT

## 2024-09-09 PROCEDURE — 80053 COMPREHEN METABOLIC PANEL: CPT

## 2024-09-09 PROCEDURE — 36415 COLL VENOUS BLD VENIPUNCTURE: CPT

## 2024-09-09 PROCEDURE — 84439 ASSAY OF FREE THYROXINE: CPT

## 2024-09-28 NOTE — PROGRESS NOTES
..Annual Exam       HISTORY OF PRESENT ILLNESS    This 58 y.o. female patient presented to the clinic today for a wellness CPX.  She has been feeling well.    She has been sleeping okay; varies.  She and her  had the flu 4-6 weeks ago.   Her appetite has been increased lately. She has gained 8 #.   She walks in the mall 3 times a week.  She is retired.  She lives with her .  She has 1 daughter and 1 stepson.  She does not smoke.    She rarely has alcohol.    Colonoscopy 2023: Dr. Treviño; mild diverticulosis, follow-up in 7 years.  She sees a gyn at [facility]; last Pap . She is getting setting up at [facility].  Last mammogram:  2024.     The patient's Health Maintenance was reviewed and the following appears to be due at this time:   Health Maintenance Due   Topic Date Due    Hepatitis C Screening  Never done    HIV Screening  Never done    Shingles Vaccine (1 of 2) Never done    Cervical Cancer Screening  2021    Influenza Vaccine (1) 2024       ..  Past Medical History:   Diagnosis Date    ADHD (attention deficit hyperactivity disorder), inattentive type     Anxiety with depression     Diverticulosis of sigmoid colon     Gastritis     Lateral epicondylitis, right elbow     Neck pain     Restless legs syndrome     Vertigo           ..  Past Surgical History:   Procedure Laterality Date    AUGMENTATION OF BREAST Bilateral 2006    Subpectoral Silicone Implants    BTL       SECTION      COLONOSCOPY  2015    ENDOMETRIAL ABLATION  10/1996    ESOPHAGOGASTRODUODENOSCOPY  2024    Excision, tumor, soft tissue of back or flank, subcutaneous; 3 cm or greater       myelogram  2020    CSF Leak    REPLACEMENT OF IMPLANT OF BREAST Bilateral 2016    Subpectoral Breast Implants          Current Outpatient Medications   Medication Instructions    ascorbic acid (vitamin C) (VITAMIN C) 1,000 mg    aspirin (ECOTRIN) 81 MG EC tablet 0     dextroamphetamine sulfate (DEXTROSTAT) 10 mg, Oral, 2 times daily, Fill: 9/02/2024.    mecobalamin, vitamin B12, 1,000 mcg Chew tablet    metronidazole 0.75% (METROCREAM) 0.75 g, 2 times daily    multivitamin (THERAGRAN) per tablet 1 tablet, Daily    pramipexole (MIRAPEX) 1 MG tablet Take 1 tablet by mouth 2-3 hours before bedtime.    sucralfate (CARAFATE) 1 gram tablet 60 tablets         ..  Social History     Socioeconomic History    Marital status:     Number of children: 1   Occupational History    Occupation: retired   Tobacco Use    Smoking status: Never    Smokeless tobacco: Never   Substance and Sexual Activity    Alcohol use: Yes     Comment: 1-2 times a year    Drug use: Never    Sexual activity: Yes     Partners: Male     Birth control/protection: None     Social Drivers of Health     Financial Resource Strain: Low Risk  (10/1/2024)    Overall Financial Resource Strain (CARDIA)     Difficulty of Paying Living Expenses: Not hard at all   Food Insecurity: No Food Insecurity (10/1/2024)    Hunger Vital Sign     Worried About Running Out of Food in the Last Year: Never true     Ran Out of Food in the Last Year: Never true   Transportation Needs: No Transportation Needs (10/1/2024)    TRANSPORTATION NEEDS     Transportation : No   Physical Activity: Sufficiently Active (10/1/2024)    Exercise Vital Sign     Days of Exercise per Week: 4 days     Minutes of Exercise per Session: 60 min   Stress: No Stress Concern Present (10/1/2024)    [employer] - Occupational Stress Questionnaire     Feeling of Stress : Not at all   Housing Stability: Low Risk  (10/1/2024)    Housing Stability Vital Sign     Unable to Pay for Housing in the Last Year: No     Homeless in the Last Year: No          ..  Family History   Problem Relation Name Age of Onset    Dementia Mother      Diabetes type II Mother      Heart disease Mother      Diabetes type II Father      Heart attack Father      Heart  "disease Father      Kidney disease Father      Arthritis Sister      Brain cancer Sister      Transient ischemic attack Sister      Drug abuse Brother      Heart attack Brother      Hodgkin's lymphoma Brother      Leukemia Brother      Leukemia Brother            ..Review of patient's allergies indicates:  No Known Allergies       ..  Immunization History   Administered Date(s) Administered    Tdap 02/22/2019          REVIEW OF SYSTEMS:    GENERAL:   no unexplained wt gain/loss,  fever, fatigue, chills, night sweats or weakness  HEENT: no sore throat, ear pain, sinus pressure, nasal congestion, or rhinorrhea  VISION: no vision changes, glaucoma, cataracts, + glasses and contacts  CARDIAC: no chest pain, palpitations, dyspnea on exertion, orthopnea  RESPIRATORY: no cough, wheezing, sputum production, or SOB  GI: no abdominal pain, n&v, no heartburn, constipation, diarrhea,  blood in stool or  (+)family history of colon cancer: paternal aunt and uncle  : no dysuria, hematuria, frequency,  urgency, incontinence,  vaginal discharge,  abnormal vaginal bleeding  MUSC/SKEL:  no myalgia, weakness, edema,  arthralgia, or joint effusion, + restless legs  SKIN:  No rash, hives, itching or sores  NEURO:  No headaches, numbness,  tingling, weakness, or dizziness  PSYCH:  No anxiety,  depression,  irritability,  suicidal ideation or hallucinations  ENDO:  No polyuria, polydipsia,  polyphagia  HEME:  + bruising: had large bruises left triceps under left arm that lasted a few weeks,  lymphadenopathy, bleeding disorders, no anemia       PHYSICAL EXAM:    Visit Vitals  /76   Pulse 68   Temp 97.7 °F (36.5 °C)   Ht 5' 6" (1.676 m)   Wt 74.4 kg (164 lb)   LMP  (LMP Unknown)   SpO2 98%   BMI 26.47 kg/m²       GENERAL:  Well-developed well-nourished white female in NAD, alert and oriented x 3  SKIN:  no rash or abnormal appearing skin lesions  HEENT:  PERRLA, EOMI, mouth wnl, throat wnl, EAC and TM wnl bilaterally  NECK:  FROM, no " lymphadenopathy, no thyroid abnormalities palpable  CHEST:  CTA bilaterally no wheezes, crackles or rubs  CARDIAC:  RRR, no murmurs audible  ABDOMEN:  Soft, nontender, nondistended, NBSx4, no rebound or guarding, no HSM  EXTREMITIES:  no clubbing, cyanosis, or edema.  joints wnl. +2 DP/PT pulse bilaterally  NEURO:  no sensory or motor deficits noted. CN II-XII intact. Gait wnl.           ASSESSMENT AND PLAN     1. Encounter for wellness examination in adult  Overview:  Patient presents for wellness examination.    She has been feeling well.   Her sleep varies.  Her appetite varies.  Patient encouraged to increase physical activity, exercise and lose weight.    Colonoscopy 02/2023: Dr. Treviño; mild diverticulosis, follow-up in 7 years.  Last Pap smear was in 2021.  Last mammogram was in 2021.  She has contacted a gyn to make an appointment.  Derm:  Dr. Xavi Espinoza.   Her EKG today reveals normal sinus rhythm and is within normal limits.    She declines a Shingrix vaccine.    Patient will return for labs; order placed.    10/01/2024:  Patient has been feeling well.    Patient had fatigue after recent flu; her energy levels are returning.    Patient encouraged to make healthy food choices and limit portions.    Patient encouraged to continue to exercise regularly.    Colonoscopy 02/14/2023: Dr. Treviño; mild diverticulosis, follow-up in 7 years  She is awaiting appointment with [facility].  Last mammogram 07/11/2024:   Labs reviewed with patient.    Patient declines influenza and Shingrix vaccines.        2. Anxiety with depression  Overview:  Patient states her mood is stable.    She denies any panic attacks, crying spells, sadness, depression or suicidal thoughts.    10/01/2024: Patient states her mood is stable.    She denies any panic attacks, crying spells, sadness, depression or suicidal thoughts.      3. Moderate mixed hyperlipidemia not requiring statin therapy  Overview:  Patient will return for  lipid profile; order placed in chart.    10/01/2024:   Lipid profile: Total cholesterol 186, HDL 74, triglycerides 66 and LDL 99.  Continue low-cholesterol/low-fat diet.    Continue regular exercise.        4. Hypothyroidism, unspecified type  Overview:  Patient with history of hypothyroidism; will check TSH with pending labs.    10/01/2024:  Patient with history of hypothyroidism; TSH 2.43, free T4 0.96.      5. Gastroesophageal reflux disease without esophagitis  Overview:  Well controlled; she takes omeprazole as needed.    07/02/2024: GI discontinue omeprazole.  Patient is now on Carafate.    10/01/2024: Patient states her reflux is doing well; she takes Carafate quite infrequently.      6. Restless legs syndrome  Overview:  Resume Mirapex 1 mg; patient takes 1/2 tablet.    Patient can not take a whole tablet due to sedation and drowsiness.  She does not take medicine all the time.    10/01/2024:  Patient still has issues with restless legs at times; she takes Mirapex quite infrequently.      7. ADHD (attention deficit hyperactivity disorder), inattentive type  Overview:  Patient is doing well on medications; refills x3 given.    10/31/2023: Patient is doing well on medications; refills x3 sent to pharmacy.  Narcotics agreement updated today.    02/15/2024: Patient is doing well on medications; refills x3 sent to pharmacy.    07/02/2024: Patient is doing well on medications; refills x3 sent to pharmacy.    10/01/2024:  Patient will schedule appointment soon for refill  of medications.      8. Immunization refused  Overview:  Patient declines a shingles vaccine; benefits/potential risks/side effects discussed with patient.    10/31/2023: Patient declines a flu shot this time; benefits/potential risks/side effects discussed with patient.    10/01/2024: Patient declines influenza vaccine and Shingrix vaccines; benefits, side effects and risks discussed with patient.           ..Follow up in about 1 year (around  10/1/2025) for Wellness, With labwork prior to visit.     Future Appointments   Date Time Provider Department Center   10/3/2024  9:15 AM Mitch Emmanuel MD [facility] [place]    10/6/2025  3:45 PM Mitch Emmanuel MD [facility] [place] [facility]

## 2024-10-01 ENCOUNTER — OFFICE VISIT (OUTPATIENT)
Dept: PRIMARY CARE CLINIC | Facility: CLINIC | Age: 59
End: 2024-10-01
Payer: COMMERCIAL

## 2024-10-01 VITALS
SYSTOLIC BLOOD PRESSURE: 112 MMHG | DIASTOLIC BLOOD PRESSURE: 76 MMHG | OXYGEN SATURATION: 98 % | BODY MASS INDEX: 26.36 KG/M2 | WEIGHT: 164 LBS | HEIGHT: 66 IN | HEART RATE: 68 BPM | TEMPERATURE: 98 F

## 2024-10-01 DIAGNOSIS — F90.0 ADHD (ATTENTION DEFICIT HYPERACTIVITY DISORDER), INATTENTIVE TYPE: ICD-10-CM

## 2024-10-01 DIAGNOSIS — G25.81 RESTLESS LEGS SYNDROME: ICD-10-CM

## 2024-10-01 DIAGNOSIS — Z00.00 ENCOUNTER FOR WELLNESS EXAMINATION IN ADULT: Primary | ICD-10-CM

## 2024-10-01 DIAGNOSIS — F41.8 ANXIETY WITH DEPRESSION: ICD-10-CM

## 2024-10-01 DIAGNOSIS — E78.2 MODERATE MIXED HYPERLIPIDEMIA NOT REQUIRING STATIN THERAPY: ICD-10-CM

## 2024-10-01 DIAGNOSIS — K21.9 GASTROESOPHAGEAL REFLUX DISEASE WITHOUT ESOPHAGITIS: ICD-10-CM

## 2024-10-01 DIAGNOSIS — Z28.21 IMMUNIZATION REFUSED: ICD-10-CM

## 2024-10-01 DIAGNOSIS — E03.9 HYPOTHYROIDISM, UNSPECIFIED TYPE: ICD-10-CM

## 2024-10-01 PROCEDURE — 1159F MED LIST DOCD IN RCRD: CPT | Mod: CPTII,,, | Performed by: FAMILY MEDICINE

## 2024-10-01 PROCEDURE — 3078F DIAST BP <80 MM HG: CPT | Mod: CPTII,,, | Performed by: FAMILY MEDICINE

## 2024-10-01 PROCEDURE — 1160F RVW MEDS BY RX/DR IN RCRD: CPT | Mod: CPTII,,, | Performed by: FAMILY MEDICINE

## 2024-10-01 PROCEDURE — 99396 PREV VISIT EST AGE 40-64: CPT | Mod: ,,, | Performed by: FAMILY MEDICINE

## 2024-10-01 PROCEDURE — 3074F SYST BP LT 130 MM HG: CPT | Mod: CPTII,,, | Performed by: FAMILY MEDICINE

## 2024-10-01 PROCEDURE — 3044F HG A1C LEVEL LT 7.0%: CPT | Mod: CPTII,,, | Performed by: FAMILY MEDICINE

## 2024-10-01 PROCEDURE — 3008F BODY MASS INDEX DOCD: CPT | Mod: CPTII,,, | Performed by: FAMILY MEDICINE

## 2024-10-10 ENCOUNTER — TELEPHONE (OUTPATIENT)
Facility: CLINIC | Age: 59
End: 2024-10-10
Payer: COMMERCIAL

## 2024-10-13 NOTE — PROGRESS NOTES
"Medication Refill (Refused flu vaccine /)       HPI:      Patient presents for 3 month recheck/refill medications.  Patient states medications are working well; she is able to concentrate, focus and stay on task.  She denies palpitations, unintentional weight loss, headaches or dry mouth.     reviewed.    Narcotics agreement 10/15/2024.      Current Outpatient Medications   Medication Instructions    ascorbic acid (vitamin C) (VITAMIN C) 1,000 mg    aspirin (ECOTRIN) 81 MG EC tablet 0    dextroamphetamine sulfate (DEXTROSTAT) 10 mg, Oral, 2 times daily    dextroamphetamine sulfate (DEXTROSTAT) 10 mg, Oral, 2 times daily, Fill: 11/14/2024.    dextroamphetamine sulfate (DEXTROSTAT) 10 mg, Oral, Daily, Fill: 12/13/2024.    mecobalamin, vitamin B12, 1,000 mcg Chew tablet    metronidazole 0.75% (METROCREAM) 0.75 g, 2 times daily    multivitamin (THERAGRAN) per tablet 1 tablet, Daily    pramipexole (MIRAPEX) 1 MG tablet Take 1 tablet by mouth 2-3 hours before bedtime.    sucralfate (CARAFATE) 1 gram tablet 60 tablets         ROS:    She has been feeling well.  She has been sleeping well.  Her appetite has been good.      PE:    ..Visit Vitals  /87   Pulse 86   Temp 98 °F (36.7 °C)   Ht 5' 6" (1.676 m)   Wt 75 kg (165 lb 4.8 oz)   LMP  (LMP Unknown)   SpO2 97%   BMI 26.68 kg/m²        General:  She is well-developed well-nourished white female in no apparent distress.  She is alert and oriented.    Chest: Clear to auscultation bilaterally.    CV: Regular rate and rhythm without murmurs rubs or gallops.        1. ADHD (attention deficit hyperactivity disorder), inattentive type  Overview:  Patient is doing well on medications; refills x3 given.    10/31/2023: Patient is doing well on medications; refills x3 sent to pharmacy.  Narcotics agreement updated today.    02/15/2024: Patient is doing well on medications; refills x3 sent to pharmacy.    07/02/2024: Patient is doing well on medications; refills x3 sent to " pharmacy.    10/01/2024:  Patient will schedule appointment soon for refill  of medications.    10/15/2024:  Patient is doing well on medications; refills x3 given.    Narcotics agreement updated.      Other orders  -     dextroamphetamine sulfate (DEXTROSTAT) 10 MG tablet; Take 1 tablet (10 mg total) by mouth 2 (two) times daily.  Dispense: 60 tablet; Refill: 0  -     dextroamphetamine sulfate (DEXTROSTAT) 10 MG tablet; Take 1 tablet (10 mg total) by mouth 2 (two) times daily. Fill: 11/14/2024.  Dispense: 60 tablet; Refill: 0  -     dextroamphetamine sulfate (DEXTROSTAT) 10 MG tablet; Take 1 tablet (10 mg total) by mouth once daily. Fill: 12/13/2024.  Dispense: 30 tablet; Refill: 0              ..Follow up in about 3 months (around 1/15/2025) for ADD Follow Up, Follow Up.       Future Appointments   Date Time Provider Department Center   10/6/2025  3:45 PM Mitch Emmanuel MD [facility]

## 2024-10-15 ENCOUNTER — OFFICE VISIT (OUTPATIENT)
Dept: PRIMARY CARE CLINIC | Facility: CLINIC | Age: 59
End: 2024-10-15
Payer: COMMERCIAL

## 2024-10-15 VITALS
HEART RATE: 86 BPM | TEMPERATURE: 98 F | BODY MASS INDEX: 26.57 KG/M2 | HEIGHT: 66 IN | OXYGEN SATURATION: 97 % | WEIGHT: 165.31 LBS | DIASTOLIC BLOOD PRESSURE: 87 MMHG | SYSTOLIC BLOOD PRESSURE: 136 MMHG

## 2024-10-15 DIAGNOSIS — F90.0 ADHD (ATTENTION DEFICIT HYPERACTIVITY DISORDER), INATTENTIVE TYPE: Primary | ICD-10-CM

## 2024-10-15 PROCEDURE — 99213 OFFICE O/P EST LOW 20 MIN: CPT | Mod: ,,, | Performed by: FAMILY MEDICINE

## 2024-10-15 PROCEDURE — 1159F MED LIST DOCD IN RCRD: CPT | Mod: CPTII,,, | Performed by: FAMILY MEDICINE

## 2024-10-15 PROCEDURE — 3075F SYST BP GE 130 - 139MM HG: CPT | Mod: CPTII,,, | Performed by: FAMILY MEDICINE

## 2024-10-15 PROCEDURE — 1160F RVW MEDS BY RX/DR IN RCRD: CPT | Mod: CPTII,,, | Performed by: FAMILY MEDICINE

## 2024-10-15 PROCEDURE — 3044F HG A1C LEVEL LT 7.0%: CPT | Mod: CPTII,,, | Performed by: FAMILY MEDICINE

## 2024-10-15 PROCEDURE — 3008F BODY MASS INDEX DOCD: CPT | Mod: CPTII,,, | Performed by: FAMILY MEDICINE

## 2024-10-15 PROCEDURE — 3079F DIAST BP 80-89 MM HG: CPT | Mod: CPTII,,, | Performed by: FAMILY MEDICINE

## 2024-10-15 RX ORDER — DEXTROAMPHETAMINE SULFATE 10 MG/1
10 TABLET ORAL 2 TIMES DAILY
Qty: 60 TABLET | Refills: 0 | Status: SHIPPED | OUTPATIENT
Start: 2024-10-15 | End: 2024-11-14

## 2024-10-15 RX ORDER — DEXTROAMPHETAMINE SULFATE 10 MG/1
10 TABLET ORAL DAILY
Qty: 30 TABLET | Refills: 0 | Status: SHIPPED | OUTPATIENT
Start: 2024-10-15 | End: 2024-11-14

## 2024-12-24 ENCOUNTER — TELEPHONE (OUTPATIENT)
Dept: PRIMARY CARE CLINIC | Facility: CLINIC | Age: 59
End: 2024-12-24
Payer: COMMERCIAL

## 2024-12-24 NOTE — TELEPHONE ENCOUNTER
Patient states Dextrostat prescription should say 1 tab BID. Patient states did not fill prescription @ [facility] on [address]. First two rx's correct but 3 rx  incorrect.  Patient requesting rx be Escribed to [facility] on [address]. Patient has enough til Friday Please advise

## 2024-12-26 DIAGNOSIS — F90.0 ADHD (ATTENTION DEFICIT HYPERACTIVITY DISORDER), INATTENTIVE TYPE: Primary | ICD-10-CM

## 2024-12-26 RX ORDER — DEXTROAMPHETAMINE SULFATE 10 MG/1
10 TABLET ORAL 2 TIMES DAILY
Qty: 60 TABLET | Refills: 0 | Status: SHIPPED | OUTPATIENT
Start: 2024-12-26 | End: 2025-01-25

## 2025-02-18 DIAGNOSIS — F90.0 ADHD (ATTENTION DEFICIT HYPERACTIVITY DISORDER), INATTENTIVE TYPE: ICD-10-CM

## 2025-02-18 RX ORDER — DEXTROAMPHETAMINE SULFATE 10 MG/1
10 TABLET ORAL 2 TIMES DAILY
Qty: 60 TABLET | Refills: 0 | Status: SHIPPED | OUTPATIENT
Start: 2025-02-18 | End: 2025-03-20

## 2025-03-16 NOTE — PROGRESS NOTES
"Follow-up (Would like to get back on adderall)       HPI:    Patient presents recheck/refill medications.  Patient states medications are not working as well as the Adderall; she is able to concentrate, focus and stay on task.  She denies palpitations, unintentional weight loss, headaches or dry mouth.     reviewed.    Narcotics agreement 10/15/2024.    Pt tried Dextrostat due to shortage of Adderall. She was told shortage has been resolved.       Current Outpatient Medications   Medication Instructions    ascorbic acid (vitamin C) (VITAMIN C) 1,000 mg    aspirin (ECOTRIN) 81 MG EC tablet 0    dextroamphetamine-amphetamine 30 mg Tab 30 mg, Oral, 2 times daily    dextroamphetamine-amphetamine 30 mg Tab 30 mg, Oral, 2 times daily, Fill: 4/21/2025.    dextroamphetamine-amphetamine 30 mg Tab 30 mg, Oral, 2 times daily, Fill: 5/21/2024.    mecobalamin, vitamin B12, 1,000 mcg Chew tablet    metronidazole 0.75% (METROCREAM) 0.75 g, 2 times daily    multivitamin (THERAGRAN) per tablet 1 tablet, Daily    pramipexole (MIRAPEX) 1 MG tablet Take 1 tablet by mouth 2-3 hours before bedtime.    sucralfate (CARAFATE) 1 gram tablet 60 tablets         ROS:    She has been feeling well.  She has been sleeping well.  Her appetite has been good.    Her anxiety and depression have been doing well.      PE:    ..Visit Vitals  /85   Pulse 98   Ht 5' 6" (1.676 m)   Wt 74.4 kg (164 lb)   LMP  (LMP Unknown)   SpO2 97%   BMI 26.47 kg/m²      General:  She is well-developed well-nourished white female in no apparent distress.  She is alert and oriented.    Chest: Clear to auscultation bilaterally.    CV: Regular rate and rhythm without murmurs rubs or gallops.          1. ADHD (attention deficit hyperactivity disorder), inattentive type  Overview:  Patient is doing well on medications; refills x3 given.    10/31/2023: Patient is doing well on medications; refills x3 sent to pharmacy.  Narcotics agreement updated today.    02/15/2024: " Patient is doing well on medications; refills x3 sent to pharmacy.    07/02/2024: Patient is doing well on medications; refills x3 sent to pharmacy.    10/01/2024:  Patient will schedule appointment soon for refill  of medications.    10/15/2024:  Patient is doing well on medications; refills x3 given.    Narcotics agreement updated.    Assessment & Plan:  Patient checked with her pharmacist and Adderall 30 mg is back in stock.  She would like to resume this medication.  Dextrostat did not work as well.  Refills x3 sent to pharmacy.    Orders:  -     Discontinue: dextroamphetamine-amphetamine 30 mg Tab; Take 1 tablet (30 mg total) by mouth 2 (two) times a day.  Dispense: 60 tablet; Refill: 0  -     Discontinue: dextroamphetamine-amphetamine 30 mg Tab; Take 1 tablet (30 mg total) by mouth 2 (two) times a day. Fill: 4/21/2025.  Dispense: 60 tablet; Refill: 0  -     Discontinue: dextroamphetamine-amphetamine 30 mg Tab; Take 1 tablet (30 mg total) by mouth 2 (two) times a day. Fill: 5/21/2024.  Dispense: 60 tablet; Refill: 0  -     dextroamphetamine-amphetamine 30 mg Tab; Take 1 tablet (30 mg total) by mouth 2 (two) times a day.  Dispense: 60 tablet; Refill: 0  -     dextroamphetamine-amphetamine 30 mg Tab; Take 1 tablet (30 mg total) by mouth 2 (two) times a day. Fill: 4/21/2025.  Dispense: 60 tablet; Refill: 0  -     dextroamphetamine-amphetamine 30 mg Tab; Take 1 tablet (30 mg total) by mouth 2 (two) times a day. Fill: 5/21/2024.  Dispense: 60 tablet; Refill: 0              ..Follow up in about 3 months (around 6/21/2025) for ADD Follow Up.       Future Appointments   Date Time Provider Department Center   6/24/2025  1:30 PM Mitch Emmanuel MD [facility] [place]    10/6/2025  3:45 PM Mitch Emmanuel MD [facility]

## 2025-03-21 ENCOUNTER — OFFICE VISIT (OUTPATIENT)
Dept: PRIMARY CARE CLINIC | Facility: CLINIC | Age: 60
End: 2025-03-21
Payer: COMMERCIAL

## 2025-03-21 VITALS
OXYGEN SATURATION: 97 % | WEIGHT: 164 LBS | HEART RATE: 98 BPM | DIASTOLIC BLOOD PRESSURE: 85 MMHG | HEIGHT: 66 IN | BODY MASS INDEX: 26.36 KG/M2 | SYSTOLIC BLOOD PRESSURE: 135 MMHG

## 2025-03-21 DIAGNOSIS — F90.0 ADHD (ATTENTION DEFICIT HYPERACTIVITY DISORDER), INATTENTIVE TYPE: Primary | ICD-10-CM

## 2025-03-21 RX ORDER — DEXTROAMPHETAMINE SACCHARATE, AMPHETAMINE ASPARTATE, DEXTROAMPHETAMINE SULFATE AND AMPHETAMINE SULFATE 7.5; 7.5; 7.5; 7.5 MG/1; MG/1; MG/1; MG/1
30 TABLET ORAL 2 TIMES DAILY
Qty: 60 TABLET | Refills: 0 | Status: SHIPPED | OUTPATIENT
Start: 2025-03-21 | End: 2025-03-21 | Stop reason: SDUPTHER

## 2025-03-21 RX ORDER — DEXTROAMPHETAMINE SACCHARATE, AMPHETAMINE ASPARTATE, DEXTROAMPHETAMINE SULFATE AND AMPHETAMINE SULFATE 7.5; 7.5; 7.5; 7.5 MG/1; MG/1; MG/1; MG/1
30 TABLET ORAL 2 TIMES DAILY
Qty: 60 TABLET | Refills: 0 | Status: SHIPPED | OUTPATIENT
Start: 2025-03-21 | End: 2025-04-20

## 2025-03-21 NOTE — ASSESSMENT & PLAN NOTE
Patient checked with her pharmacist and Adderall 30 mg is back in stock.  She would like to resume this medication.  Dextrostat did not work as well.  Refills x3 sent to pharmacy.

## 2025-04-14 ENCOUNTER — PATIENT MESSAGE (OUTPATIENT)
Facility: CLINIC | Age: 60
End: 2025-04-14
Payer: COMMERCIAL

## 2025-06-06 ENCOUNTER — TELEPHONE (OUTPATIENT)
Dept: PRIMARY CARE CLINIC | Facility: CLINIC | Age: 60
End: 2025-06-06
Payer: COMMERCIAL

## 2025-06-08 DIAGNOSIS — R05.1 ACUTE COUGH: Primary | ICD-10-CM

## 2025-06-09 ENCOUNTER — RESULTS FOLLOW-UP (OUTPATIENT)
Dept: PRIMARY CARE CLINIC | Facility: CLINIC | Age: 60
End: 2025-06-09

## 2025-06-09 ENCOUNTER — TELEPHONE (OUTPATIENT)
Dept: PRIMARY CARE CLINIC | Facility: CLINIC | Age: 60
End: 2025-06-09
Payer: COMMERCIAL

## 2025-06-09 RX ORDER — AZITHROMYCIN 250 MG/1
TABLET, FILM COATED ORAL
Qty: 6 TABLET | Refills: 0 | Status: SHIPPED | OUTPATIENT
Start: 2025-06-09 | End: 2025-06-14

## 2025-06-09 NOTE — TELEPHONE ENCOUNTER
Spoke with patient concerning symptoms. Patient states has been having persistent productive cough x 2 weeks,  was having fever several days ago, having chills and sweating. Patient has been taking Advil Allergy OTC but isn't sleeping well with coughing at night. Patient states lost voice last week and feels like it is improving.  Patient asking if antibiotics would help? Please advise

## 2025-06-09 NOTE — TELEPHONE ENCOUNTER
Prescription for Zithromax sent.  Patient is to call for symptoms do not improve after finishing antibiotics.

## 2025-06-19 NOTE — PROGRESS NOTES
"Medication Refill (3 month med refill)       HPI:    Patient presents recheck/refill medications.  Patient states medications are not working as well as the Adderall; she is able to concentrate, focus and stay on task.  She denies palpitations, unintentional weight loss, headaches or dry mouth.     reviewed.    Narcotics agreement 10/15/2024.      Current Outpatient Medications   Medication Instructions    ascorbic acid (vitamin C) (VITAMIN C) 1,000 mg    aspirin (ECOTRIN) 81 MG EC tablet 0    dextroamphetamine-amphetamine 30 mg Tab 30 mg, Oral, 2 times daily, Fill: 8/22/2025.    dextroamphetamine-amphetamine 30 mg Tab 30 mg, Oral, 2 times daily, Fill: 6/29/2025.    dextroamphetamine-amphetamine 30 mg Tab 30 mg, Oral, 2 times daily, Fill: 7/29/2025.    mecobalamin, vitamin B12, 1,000 mcg Chew tablet    metronidazole 0.75% (METROCREAM) 0.75 g, 2 times daily    multivitamin (THERAGRAN) per tablet 1 tablet, Daily    pramipexole (MIRAPEX) 1 MG tablet Take 1 tablet by mouth 2-3 hours before bedtime.    sucralfate (CARAFATE) 1 gram tablet 60 tablets         ROS:    She has been feeling well.  She has been sleeping well.  Her appetite has been decreased in the past 3 weeks secondary to virus.   She is in the process of finding a gyn.       PE:    ..Visit Vitals  /83   Pulse 93   Temp 97.7 °F (36.5 °C)   Ht 5' 6" (1.676 m)   Wt 72.1 kg (159 lb)   LMP  (LMP Unknown)   SpO2 98%   BMI 25.66 kg/m²      General:  She is well-developed well-nourished white female in no apparent distress.  She is alert and oriented.    Chest: Clear to auscultation bilaterally.    CV: Regular rate and rhythm without murmurs rubs or gallops.          1. ADHD (attention deficit hyperactivity disorder), inattentive type  Overview:  Patient is doing well on medications; refills x3 given.    10/31/2023: Patient is doing well on medications; refills x3 sent to pharmacy.  Narcotics agreement updated today.    02/15/2024: Patient is doing well on " medications; refills x3 sent to pharmacy.    07/02/2024: Patient is doing well on medications; refills x3 sent to pharmacy.    10/01/2024:  Patient will schedule appointment soon for refill  of medications.    10/15/2024:  Patient is doing well on medications; refills x3 given.    Narcotics agreement updated.    Assessment & Plan:  06/23/2025: Patient is doing well on medications; refills x3 given.    Orders:  -     Discontinue: dextroamphetamine-amphetamine 30 mg Tab; Take 1 tablet (30 mg total) by mouth 2 (two) times a day.  Dispense: 60 tablet; Refill: 0  -     Discontinue: dextroamphetamine-amphetamine 30 mg Tab; Take 1 tablet (30 mg total) by mouth 2 (two) times a day. Fill: 7/23/2025.  Dispense: 60 tablet; Refill: 0  -     dextroamphetamine-amphetamine 30 mg Tab; Take 1 tablet (30 mg total) by mouth 2 (two) times a day. Fill: 8/22/2025.  Dispense: 60 tablet; Refill: 0  -     dextroamphetamine-amphetamine 30 mg Tab; Take 1 tablet (30 mg total) by mouth 2 (two) times a day. Fill: 6/29/2025.  Dispense: 60 tablet; Refill: 0  -     dextroamphetamine-amphetamine 30 mg Tab; Take 1 tablet (30 mg total) by mouth 2 (two) times a day. Fill: 7/29/2025.  Dispense: 60 tablet; Refill: 0    2. Screening mammogram for breast cancer  Overview:  06/23/2025:  Her last mammogram was 07/11/2024.    Order sent to [facility] for patient to do mammogram next month.    Orders:  -     Mammo Digital Screening Bilat; Future; Expected date: 06/24/2025              ..Follow up in about 3 months (around 9/24/2025) for Wellness, With labwork prior to visit.       Future Appointments   Date Time Provider Department Center   10/6/2025  3:45 PM Mitch Emmanuel MD [facility]

## 2025-06-24 ENCOUNTER — OFFICE VISIT (OUTPATIENT)
Dept: PRIMARY CARE CLINIC | Facility: CLINIC | Age: 60
End: 2025-06-24
Payer: COMMERCIAL

## 2025-06-24 VITALS
HEIGHT: 66 IN | SYSTOLIC BLOOD PRESSURE: 134 MMHG | OXYGEN SATURATION: 98 % | BODY MASS INDEX: 25.55 KG/M2 | TEMPERATURE: 98 F | WEIGHT: 159 LBS | HEART RATE: 93 BPM | DIASTOLIC BLOOD PRESSURE: 83 MMHG

## 2025-06-24 DIAGNOSIS — F90.0 ADHD (ATTENTION DEFICIT HYPERACTIVITY DISORDER), INATTENTIVE TYPE: Primary | ICD-10-CM

## 2025-06-24 DIAGNOSIS — Z12.31 SCREENING MAMMOGRAM FOR BREAST CANCER: ICD-10-CM

## 2025-06-24 PROCEDURE — 99213 OFFICE O/P EST LOW 20 MIN: CPT | Mod: ,,, | Performed by: FAMILY MEDICINE

## 2025-06-24 PROCEDURE — 3008F BODY MASS INDEX DOCD: CPT | Mod: CPTII,,, | Performed by: FAMILY MEDICINE

## 2025-06-24 PROCEDURE — 3079F DIAST BP 80-89 MM HG: CPT | Mod: CPTII,,, | Performed by: FAMILY MEDICINE

## 2025-06-24 PROCEDURE — 1159F MED LIST DOCD IN RCRD: CPT | Mod: CPTII,,, | Performed by: FAMILY MEDICINE

## 2025-06-24 PROCEDURE — 1160F RVW MEDS BY RX/DR IN RCRD: CPT | Mod: CPTII,,, | Performed by: FAMILY MEDICINE

## 2025-06-24 PROCEDURE — 3075F SYST BP GE 130 - 139MM HG: CPT | Mod: CPTII,,, | Performed by: FAMILY MEDICINE

## 2025-06-24 RX ORDER — DEXTROAMPHETAMINE SACCHARATE, AMPHETAMINE ASPARTATE, DEXTROAMPHETAMINE SULFATE AND AMPHETAMINE SULFATE 7.5; 7.5; 7.5; 7.5 MG/1; MG/1; MG/1; MG/1
30 TABLET ORAL 2 TIMES DAILY
Qty: 60 TABLET | Refills: 0 | Status: SHIPPED | OUTPATIENT
Start: 2025-06-24 | End: 2025-06-24 | Stop reason: SDUPTHER

## 2025-06-24 RX ORDER — DEXTROAMPHETAMINE SACCHARATE, AMPHETAMINE ASPARTATE, DEXTROAMPHETAMINE SULFATE AND AMPHETAMINE SULFATE 7.5; 7.5; 7.5; 7.5 MG/1; MG/1; MG/1; MG/1
30 TABLET ORAL 2 TIMES DAILY
Qty: 60 TABLET | Refills: 0 | Status: SHIPPED | OUTPATIENT
Start: 2025-06-24 | End: 2025-07-24

## 2025-07-23 ENCOUNTER — HOSPITAL ENCOUNTER (OUTPATIENT)
Dept: RADIOLOGY | Facility: HOSPITAL | Age: 60
Discharge: HOME | End: 2025-07-23
Attending: FAMILY MEDICINE
Payer: COMMERCIAL

## 2025-07-23 DIAGNOSIS — Z12.31 SCREENING MAMMOGRAM FOR BREAST CANCER: ICD-10-CM

## 2025-07-23 PROCEDURE — 77067 SCR MAMMO BI INCL CAD: CPT | Mod: 26,,, | Performed by: RADIOLOGY

## 2025-07-23 PROCEDURE — 77067 SCR MAMMO BI INCL CAD: CPT | Mod: TC

## 2025-07-23 PROCEDURE — 77063 BREAST TOMOSYNTHESIS BI: CPT | Mod: 26,,, | Performed by: RADIOLOGY

## 2025-07-25 ENCOUNTER — PATIENT OUTREACH (OUTPATIENT)
Facility: CLINIC | Age: 60
End: 2025-07-25
Payer: COMMERCIAL

## 2025-07-25 NOTE — PROGRESS NOTES
Population Health Outreach.    7/25/25 Unable to locate record for ext 2018 PAP claim or see in notes who GYN is-portal message sent.     Referred to [facility] 6/2024 but nothing in Care Everywhere.    10/2024 She sees a gyn at [facility]; last Pap 2021. She is getting setting up at [facility]

## 2025-07-30 ENCOUNTER — PATIENT MESSAGE (OUTPATIENT)
Facility: CLINIC | Age: 60
End: 2025-07-30
Payer: COMMERCIAL

## 2025-07-30 ENCOUNTER — PATIENT OUTREACH (OUTPATIENT)
Facility: CLINIC | Age: 60
End: 2025-07-30
Payer: COMMERCIAL

## 2025-07-30 NOTE — PROGRESS NOTES
Population Health Outreach.    AGNES LOU ext claim gyn 1/7/2019 -CARLITA sent to pool for pap   2018 external claim PAP @ P[facility] @[facility] [place]  fax at [phone].-CARLITA sent to pool

## 2025-07-30 NOTE — LETTER
25    AUTHORIZATION FOR RELEASE OF   CONFIDENTIAL INFORMATION    Dr Morales,    We are seeing Shameka Cespedes, date of birth 1965, in the clinic at [facility]. Mitch Emmanuel MD is the patient's PCP. Shameka Cespedes has an outstanding lab/procedure at the time we reviewed her chart. In order to help keep her health information updated, she has authorized us to request the following medical record(s):       MOST RECENT PAP SMEAR RESULTS         Please fax records to [facility], Manuel, Chad B., MD,               Patient Name: Shameka Cespedes  : 1965  Patient Phone #: [phone]

## 2025-07-31 NOTE — PROGRESS NOTES
Most recent pap smear requested and reminder set.   2018 pap w/hpv hyperlinked to  from umbrella claim. [facility].